# Patient Record
(demographics unavailable — no encounter records)

---

## 2024-11-04 NOTE — HISTORY OF PRESENT ILLNESS
[FreeTextEntry1] :  79-year-old right-handed female who presents with a few different complaints. At this visit she is accompanied by her . History is obtained from both of them and from review of records.  Patient states that 4 years ago she saw Dr. Givens and was informed of having a Chiari malformation with syrinx. At that time it was felt that she does not need any intervention done.   For the last year she has been experiencing pain in her whole back which is aggravated by standing for about 15 to 30 minutes. She feels the pain more on the right side than the left. This is closer to the shoulder blades. She also feels a little bit of pain in the right side of her neck which goes up to her head. Patient does feel better after resting for some time  She reports having an uncomfortable funny sensation in her legs when she tries to sit and relax. The sensation is located in the thighs and the calf she also experiences muscle cramps they get better after walking. Sometimes it can take 15 to 20 minutes to resolve. These uncomfortable feelings sometimes wake her up from sleep.  She reports having pain in her knees bilaterally. She has difficulty going up the steps because she feels her legs are weak and feel heavy there is pain when coming down the steps.  She denies any changes in bowel or bladder habits. No change in color of urine. No muscle aches. She reports difficulty lifting her arms up to comb her hair and had to get her haircut because she was having difficulty combing them  Review of systems a complete review of systems was performed and is negative except as listed in HPI  Family history unremarkable  Past medical history Chiari malformation with syrinx A. fib Hypertension Stage III chronic kidney disease   Interval history -June 7, 2021 patient is here to follow-up on restless leg and Chiari with syringomyelia. And with new complaint of shooting pain down the left leg - ropinirole 0.5 mg at 7 PM has controlled her restless leg symptoms quite well. Denies any side effects on it. For the last few weeks patient has noticed sharp pain shooting behind her left buttock to thigh symptoms going down to the lower leg. This limits her ability to go up the steps and she has been using her right leg more. She also reports having knee pain bilaterally. She denies any change in bowel or bladder habits. Sometimes feels a little weak. - Her primary care physician gave her Cymbalta 30 mg daily which has been helpful with back pain. She occasionally still needs to sit down due to LBP. No falls -She had a consultation with neurosurgeon Dr. Espinosa and no neurosurgical intervention was needed For the syrinx  Interval history-December 13, 2021-patient presents to follow-up on restless leg syndrome. At this visit she is accompanied by her . She states that around 7 PM she takes ropinirole 0.5 mg. That still continues to help her symptoms but not completely. She still gets uncomfortable feeling in her legs when she tries to sit and relax usually in the evening. Massaging the legs are getting up and walking helps. The symptoms sometimes wake her up at night and disrupt sleep. Does not have any symptoms during daytime. She denies any side effects on ropinirole  Interval history-april 25, 2022 -patient presents to follow-up on restless leg syndrome. At this visit she is unaccompanied . She states that around 7 PM she takes ropinirole 1 mg. increasing dose was helpful. She still gets uncomfortable feeling in her legs when she tries to sit and relax usually in the evening. Massaging the legs are getting up and walking helps. The symptoms sometimes do not wake her up at night and disrupt sleep. Does not have any symptoms during daytime. She denies any side effects on ropinirole. had afall 4/12 when her leg got caught during a test, Xrays- no fx  Interval history-August 29, 2022. Patient is accompanied by her daughter Chasity. States that her restless leg symptoms still bother her at night. She is currently on ropinirole 1 mg. Denies any side effects on it would like to increase the dose Patient also reports having a few falls. Especially when coming down the steps. Feels that she does not pay attention and does not look at her surroundings. She does trip easily. She is reporting low back pain and knee pain for which she is seeing orthopedics. She tends to veer to the right and does better if she is walking with someone or using a shopping cart. She feels that her legs are heavy Denies any changes in bowel or bladder habits denies any shortness of breath or double vision.  Interval history: 30 January 2023. Patient is accompanied by her . States that her restless leg symptoms is better with adding and extra half pill along with a full pill of ropinirole 1 mg, so since last visit she is taking 1 and 1/2 pills of rOpinirole.  Denies any side effects on it like any drowsiness, sleepiness or compulsive behaviors.  Patient denies any falls since last visit. Her low back pain is stable now with PCP started Duloxetine. Knee pains are still constant for her bad knee arthritis. Denies any changes in bowel or bladder habits denies any shortness of breath or double vision. Mood and memory are good.   Current Meds rOPINIRole HCl - 1 MG Oral Tablet; 1 and 1/2 tab daily   amLODIPine Besylate 2.5 MG Oral Tablet; TAKE 1 TABLET BY MOUTH EVERY DAY Aspirin 81 MG TABS Iron TABS Magnesium 250 MG Oral Tablet Metoprolol Tartrate 50 MG Oral Tablet; TAKE 1 TABLET TWICE DAILY Ocuvite TABS; TAKE 1 TABLET DAILY Omeprazole 20 MG Oral Capsule Delayed Release Triamcinolone Acetonide 0.1 % External Ointment; APPLY AND GENTLY MASSAGE INTO AFFECTED AREA(S) ON BODY TWICE DAILY AS`NEEDED Vitamin B Complex TABS Vitamin D 50 MCG (2000 UT) Oral Capsule  Interval history: June 12, 2023 patient is accompanied by her . States that her restless leg symptoms is better with 2 mg of ropinirole in the evening.  She had only 2 episodes where during daytime while doing her puzzles she felt discomfort in her legs and had to get up and walk.  She had 1 episode at night.  But overall she is doing well and feels that the current dose of ropinirole is working well for her.  Denies any side effects on it like any drowsiness, sleepiness or compulsive behaviors.  Patient denies any falls since last visit. Her low back pain is stable.  Reports knee pain due to arthritis. Denies any changes in bowel or bladder habits denies any shortness of breath or double vision. Mood and memory are good.  She avoids pain medication due to kidney disease.  Denies any change in balance, no falls, no difficulty swallowing.  Rare headaches  Interval history December 18, 2023.  Patient is accompanied by her  to follow-up on restless leg and also reports neck pain.  MRI of the brain cervical spine was done.  Patient states that there has been some relief with gabapentin 100 mg she takes 1 in the morning and 2 at bedtime.  She states that the biggest difficulty she has is at night while sleeping and also when she wakes up in the morning.  She does usually well the rest of the day.  She also reports arthritis in her knees her hip her back.  Also has history of fibromyalgia.  RLS is well-controlled with ropinirole 2 mg.  She denies any side effects on gabapentin or ropinirole.  States that in the past she has tried Flexeril which has also helped with neck pain   Interval history May 6th 2024 patient here for follow up for RLS. currently ropinirole 2mg and gabapentin 200mg at bedtime. she could not tolerate the higher dose of gabapentin 400mg due to side effects of sedation she is also seeing  with pain managment  denies any falls since the last visit. sleep is fragmented because of pain in head and neck. denies any RBD sx. RLS symptoms under control with the medication MRI cervical spine : showed Chiari 1  7mm tonsillar protrusion. degenerative disc changes at c3-c5   Interval history November 4, 2024.  Patient is accompanied by her  for this visit.  She states that RLS is quite well-controlled she takes ropinirole 2 mg around 7 PM.  She takes gabapentin 300 mg at bedtime and finds that this regimen helps with the restless leg.  She still has some pain in the neck although it is better with gabapentin but still sometimes at night she has breakthrough pain.  She tried Topamax but was very foggy on it and stopped using it.  She uses as needed Flexeril and that seems to help.  Denies any falls overall feels well

## 2024-11-04 NOTE — PHYSICAL EXAM
[General Appearance - Alert] : alert [Oriented To Time, Place, And Person] : oriented to person, place, and time [Person] : oriented to person [Place] : oriented to place [Time] : oriented to time [Cranial Nerves Optic (II)] : visual acuity intact bilaterally,  visual fields full to confrontation, pupils equal round and reactive to light [Cranial Nerves Oculomotor (III)] : extraocular motion intact [Cranial Nerves Trigeminal (V)] : facial sensation intact symmetrically [Cranial Nerves Facial (VII)] : face symmetrical [Cranial Nerves Vestibulocochlear (VIII)] : hearing was intact bilaterally [Cranial Nerves Glossopharyngeal (IX)] : tongue and palate midline [Cranial Nerves Accessory (XI - Cranial And Spinal)] : head turning and shoulder shrug symmetric [Cranial Nerves Hypoglossal (XII)] : there was no tongue deviation with protrusion [Sensation Tactile Decrease] : light touch was intact [Sensation Pain / Temperature Decrease] : pain and temperature was intact [Abnormal Walk] : normal gait [FreeTextEntry1] : No mask facies  No reduced blinking No rigidity No Bradykinesia  Tremors: None  Stood up with arms crossed, walked with no shuffling or no impaired stride length, pull test negative.

## 2024-11-04 NOTE — DISCUSSION/SUMMARY
[FreeTextEntry1] : This is a 80-year-old right-handed female who presents with chief complaints of restless leg syndrome Bothersome head and neck pain follows with    Impression- Restless leg syndrome.  Chiari malformation with cervical syrinx -improvement on MRI C-spine syrinx from 2017-20 21.  Stable in 2022.  Repeated brain and C-spine MRI in 2023, repeated MRI c spine in 4/2024    lumbar spondylosis tethered cord syndrome  lumbar radiculopathy, arthritis.  Plan -Continue ropinirole 2 mg 1 tab qd, cont GBP 300mg QHS, May try 100 mg as needed during daytime s/e discussed Finds Flexeril 10mg as needed helpful, will continue to use prn - advise the patient to touch base with  for head and neck pain management  Follow-up in 1 yr or prn  All questions were addressed and answered.  We discussed the above impression, plan and recommendation during the visit. Counseling represented more than 50% of the 30-minute visit time

## 2025-03-25 NOTE — HISTORY OF PRESENT ILLNESS
[Headache] : headache [FreeTextEntry1] : Pt  returns today with reports of constant dull headache, neck pain that radiates to right shoulder and arm, Taking Tylenol  1-2x/ day. Reports " It is not as bad as it was in the past." Gabapentin 300mg at night. Pt denies any new symptoms.  Discussed plan of trial of myofascial release - and possible consultation for TPI for cervicogenic headache . Pt is on Eliquis ..  [Neck Pain] : neck pain

## 2025-03-25 NOTE — ASSESSMENT
[FreeTextEntry1] : Chronic cervicogenic headache Myofascial release / PT - [facility] phone # given ( for [place] )  Consider TPI in future if PT not helpful.

## 2025-03-25 NOTE — REVIEW OF SYSTEMS
[Fever] : no fever [Feeling Poorly] : feeling poorly [Eyesight Problems] : no eyesight problems [Nasal Discharge] : no nasal discharge [Chest Pain] : no chest pain [Cough] : no cough [Arthralgias] : arthralgias [Joint Pain] : joint pain [Neck Pain] : neck pain [Skin Lesions] : no skin lesions [Itching] : no itching [As Noted in HPI] : as noted in HPI [Sleep Disturbances] : sleep disturbances [Muscle Weakness] : no muscle weakness

## 2025-03-25 NOTE — PHYSICAL EXAM
[General Appearance - Alert] : alert [General Appearance - Well Nourished] : well nourished [Oriented To Time, Place, And Person] : oriented to person, place, and time [Impaired Insight] : insight and judgment were intact [Affect] : the affect was normal [Memory Recent] : recent memory was not impaired [Person] : oriented to person [Place] : oriented to place [Time] : oriented to time [Short Term Intact] : short term memory intact [Remote Intact] : remote memory intact [Registration Intact] : recent registration memory intact [Concentration Intact] : normal concentrating ability [Visual Intact] : visual attention was ~T not ~L decreased [Fluency] : fluency intact [Comprehension] : comprehension intact [Current Events] : adequate knowledge of current events [Past History] : adequate knowledge of personal past history [Vocabulary] : adequate range of vocabulary [Cranial Nerves Oculomotor (III)] : extraocular motion intact [Cranial Nerves Facial (VII)] : face symmetrical [Cranial Nerves Vestibulocochlear (VIII)] : hearing was intact bilaterally [Cranial Nerves Accessory (XI - Cranial And Spinal)] : head turning and shoulder shrug symmetric [Cranial Nerves Hypoglossal (XII)] : there was no tongue deviation with protrusion [No Muscle Atrophy] : normal bulk in all four extremities [Motor Strength Upper Extremities Bilaterally] : strength was normal in both upper extremities [Sensation Tactile Decrease] : light touch was intact [Allodynia] : no ~T allodynia present [Tremor] : no tremor present [Dysdiadochokinesia Bilaterally] : not present [Sclera] : the sclera and conjunctiva were normal [No ZAKIA] : no internuclear ophthalmoplegia [Strabismus] : no strabismus was seen [Hearing Threshold Finger Rub Not [place]] : hearing was normal [Neck Cervical Mass (___cm)] : no neck mass was observed [Exaggerated Use Of Accessory Muscles For Inspiration] : no accessory muscle use [Nail Clubbing] : no clubbing  or cyanosis of the fingernails [Involuntary Movements] : no involuntary movements were seen [Skin Color & Pigmentation] : normal skin color and pigmentation [] : no rash

## 2025-05-20 NOTE — PHYSICAL EXAM
[No Acute Distress] : no acute distress [Normal S1, S2] : normal S1, S2 [Clear Lung Fields] : clear lung fields [Normal Gait] : normal gait [No Edema] : no edema [Alert and Oriented] : alert and oriented [de-identified] : Overweight [de-identified] : Pupils are round

## 2025-05-20 NOTE — HISTORY OF PRESENT ILLNESS
[FreeTextEntry1] : Lubna Momin is an 81-year-old woman with a history of paroxysmal atrial fibrillation (diagnosed in 2018), mild cardiac valvular insufficiencies, Chiari malformation, hypertension, CKD, restless leg syndrome, who returns for cardiac examination--our last encounter was in April 2024.    She has no specific cardiac complaints and has been tolerating sacculation (Eliquis); says her biggest problem is her diffuse osteoarthritis.  She denies angina; occasional "twinges"/palpitations but no protracted or severe episodes.    * This visit was conducted as part of ongoing, longitudinal medical care for patient's chronic medical diagnoses and other issues.

## 2025-05-20 NOTE — DISCUSSION/SUMMARY
[With Me] : with me [___ Year(s)] : in [unfilled] year(s) [FreeTextEntry1] :  Atrial fibrillation: Paroxysmal--Suspected low burden of arrhythmia and presently in sinus rhythm; continue Eliquis and metoprolol.  Hypertension: Controlled; continue Amlodipine  Renal insufficiency: She reports stable disease and sees a nephrologist regularly; takes losartan predominantly for CKD indication.